# Patient Record
Sex: FEMALE | Race: BLACK OR AFRICAN AMERICAN | NOT HISPANIC OR LATINO | URBAN - METROPOLITAN AREA
[De-identification: names, ages, dates, MRNs, and addresses within clinical notes are randomized per-mention and may not be internally consistent; named-entity substitution may affect disease eponyms.]

---

## 2017-12-24 ENCOUNTER — INPATIENT (INPATIENT)
Facility: HOSPITAL | Age: 77
LOS: 1 days | Discharge: HOME | DRG: 203 | End: 2017-12-26
Attending: STUDENT IN AN ORGANIZED HEALTH CARE EDUCATION/TRAINING PROGRAM | Admitting: STUDENT IN AN ORGANIZED HEALTH CARE EDUCATION/TRAINING PROGRAM
Payer: COMMERCIAL

## 2017-12-24 VITALS
DIASTOLIC BLOOD PRESSURE: 60 MMHG | TEMPERATURE: 99 F | HEART RATE: 93 BPM | RESPIRATION RATE: 18 BRPM | SYSTOLIC BLOOD PRESSURE: 132 MMHG | WEIGHT: 138.01 LBS | OXYGEN SATURATION: 96 % | HEIGHT: 62 IN

## 2017-12-24 DIAGNOSIS — J45.901 UNSPECIFIED ASTHMA WITH (ACUTE) EXACERBATION: ICD-10-CM

## 2017-12-24 LAB
ALBUMIN SERPL ELPH-MCNC: 3.6 G/DL — SIGNIFICANT CHANGE UP (ref 3.5–5)
ALP SERPL-CCNC: 81 U/L — SIGNIFICANT CHANGE UP (ref 40–120)
ALT FLD-CCNC: 25 U/L DA — SIGNIFICANT CHANGE UP (ref 10–60)
ANION GAP SERPL CALC-SCNC: 10 MMOL/L — SIGNIFICANT CHANGE UP (ref 5–17)
APTT BLD: 32 SEC — SIGNIFICANT CHANGE UP (ref 27.5–37.4)
AST SERPL-CCNC: 25 U/L — SIGNIFICANT CHANGE UP (ref 10–40)
BASOPHILS # BLD AUTO: 0.1 K/UL — SIGNIFICANT CHANGE UP (ref 0–0.2)
BASOPHILS NFR BLD AUTO: 1 % — SIGNIFICANT CHANGE UP (ref 0–2)
BILIRUB SERPL-MCNC: 0.3 MG/DL — SIGNIFICANT CHANGE UP (ref 0.2–1.2)
BUN SERPL-MCNC: 10 MG/DL — SIGNIFICANT CHANGE UP (ref 7–18)
CALCIUM SERPL-MCNC: 9.2 MG/DL — SIGNIFICANT CHANGE UP (ref 8.4–10.5)
CHLORIDE SERPL-SCNC: 98 MMOL/L — SIGNIFICANT CHANGE UP (ref 96–108)
CK MB BLD-MCNC: 0.8 % — SIGNIFICANT CHANGE UP (ref 0–3.5)
CK MB CFR SERPL CALC: 1.7 NG/ML — SIGNIFICANT CHANGE UP (ref 0–3.6)
CK SERPL-CCNC: 202 U/L — SIGNIFICANT CHANGE UP (ref 21–215)
CO2 SERPL-SCNC: 27 MMOL/L — SIGNIFICANT CHANGE UP (ref 22–31)
CREAT SERPL-MCNC: 0.69 MG/DL — SIGNIFICANT CHANGE UP (ref 0.5–1.3)
EOSINOPHIL # BLD AUTO: 0 K/UL — SIGNIFICANT CHANGE UP (ref 0–0.5)
EOSINOPHIL NFR BLD AUTO: 0.5 % — SIGNIFICANT CHANGE UP (ref 0–6)
GLUCOSE SERPL-MCNC: 124 MG/DL — HIGH (ref 70–99)
HCT VFR BLD CALC: 38.7 % — SIGNIFICANT CHANGE UP (ref 34.5–45)
HGB BLD-MCNC: 12.9 G/DL — SIGNIFICANT CHANGE UP (ref 11.5–15.5)
INR BLD: 0.97 RATIO — SIGNIFICANT CHANGE UP (ref 0.88–1.16)
LACTATE SERPL-SCNC: 2.1 MMOL/L — HIGH (ref 0.7–2)
LYMPHOCYTES # BLD AUTO: 2.6 K/UL — SIGNIFICANT CHANGE UP (ref 1–3.3)
LYMPHOCYTES # BLD AUTO: 36.2 % — SIGNIFICANT CHANGE UP (ref 13–44)
MAGNESIUM SERPL-MCNC: 1.5 MG/DL — LOW (ref 1.6–2.6)
MCHC RBC-ENTMCNC: 28.8 PG — SIGNIFICANT CHANGE UP (ref 27–34)
MCHC RBC-ENTMCNC: 33.3 GM/DL — SIGNIFICANT CHANGE UP (ref 32–36)
MCV RBC AUTO: 86.6 FL — SIGNIFICANT CHANGE UP (ref 80–100)
MONOCYTES # BLD AUTO: 0.5 K/UL — SIGNIFICANT CHANGE UP (ref 0–0.9)
MONOCYTES NFR BLD AUTO: 7.4 % — SIGNIFICANT CHANGE UP (ref 2–14)
NEUTROPHILS # BLD AUTO: 3.9 K/UL — SIGNIFICANT CHANGE UP (ref 1.8–7.4)
NEUTROPHILS NFR BLD AUTO: 54.9 % — SIGNIFICANT CHANGE UP (ref 43–77)
NT-PROBNP SERPL-SCNC: 23 PG/ML — SIGNIFICANT CHANGE UP (ref 0–450)
PLATELET # BLD AUTO: 236 K/UL — SIGNIFICANT CHANGE UP (ref 150–400)
POTASSIUM SERPL-MCNC: 3.7 MMOL/L — SIGNIFICANT CHANGE UP (ref 3.5–5.3)
POTASSIUM SERPL-SCNC: 3.7 MMOL/L — SIGNIFICANT CHANGE UP (ref 3.5–5.3)
PROT SERPL-MCNC: 7.3 G/DL — SIGNIFICANT CHANGE UP (ref 6–8.3)
PROTHROM AB SERPL-ACNC: 10.6 SEC — SIGNIFICANT CHANGE UP (ref 9.8–12.7)
RAPID RVP RESULT: SIGNIFICANT CHANGE UP
RBC # BLD: 4.47 M/UL — SIGNIFICANT CHANGE UP (ref 3.8–5.2)
RBC # FLD: 12.3 % — SIGNIFICANT CHANGE UP (ref 10.3–14.5)
SODIUM SERPL-SCNC: 135 MMOL/L — SIGNIFICANT CHANGE UP (ref 135–145)
TROPONIN I SERPL-MCNC: <0.015 NG/ML — SIGNIFICANT CHANGE UP (ref 0–0.04)
TROPONIN I SERPL-MCNC: <0.015 NG/ML — SIGNIFICANT CHANGE UP (ref 0–0.04)
WBC # BLD: 7.1 K/UL — SIGNIFICANT CHANGE UP (ref 3.8–10.5)
WBC # FLD AUTO: 7.1 K/UL — SIGNIFICANT CHANGE UP (ref 3.8–10.5)

## 2017-12-24 PROCEDURE — 99223 1ST HOSP IP/OBS HIGH 75: CPT | Mod: GC

## 2017-12-24 PROCEDURE — 99285 EMERGENCY DEPT VISIT HI MDM: CPT

## 2017-12-24 PROCEDURE — 93010 ELECTROCARDIOGRAM REPORT: CPT

## 2017-12-24 PROCEDURE — 71020: CPT | Mod: 26

## 2017-12-24 RX ORDER — INSULIN LISPRO 100/ML
VIAL (ML) SUBCUTANEOUS
Qty: 0 | Refills: 0 | Status: DISCONTINUED | OUTPATIENT
Start: 2017-12-24 | End: 2017-12-26

## 2017-12-24 RX ORDER — SODIUM CHLORIDE 9 MG/ML
1000 INJECTION, SOLUTION INTRAVENOUS
Qty: 0 | Refills: 0 | Status: DISCONTINUED | OUTPATIENT
Start: 2017-12-24 | End: 2017-12-24

## 2017-12-24 RX ORDER — SODIUM CHLORIDE 9 MG/ML
1000 INJECTION, SOLUTION INTRAVENOUS
Qty: 0 | Refills: 0 | Status: DISCONTINUED | OUTPATIENT
Start: 2017-12-24 | End: 2017-12-25

## 2017-12-24 RX ORDER — IPRATROPIUM/ALBUTEROL SULFATE 18-103MCG
3 AEROSOL WITH ADAPTER (GRAM) INHALATION
Qty: 0 | Refills: 0 | Status: COMPLETED | OUTPATIENT
Start: 2017-12-24 | End: 2017-12-24

## 2017-12-24 RX ADMIN — Medication 3 MILLILITER(S): at 17:57

## 2017-12-24 RX ADMIN — Medication 125 MILLIGRAM(S): at 18:16

## 2017-12-24 RX ADMIN — Medication 3 MILLILITER(S): at 17:58

## 2017-12-24 RX ADMIN — Medication 3 MILLILITER(S): at 18:12

## 2017-12-24 NOTE — ED PROVIDER NOTE - OBJECTIVE STATEMENT
76 y/o female with significant PMHx of DM, presents to the ED c/o cough and SOB x 5 days. Pt reports productive cough is throughout the day, and notes SOB is associated with and without cough. Pt denies similar Sx in the past.  Pt notes she has had wheezing in the past, however denies hospitalization or breathing treatments. Pt denies recent sick contacts, recent travel, long periods of immobilization or Hx of DVT/PE/CHF. Pt denies wheezing, CP, b/l LE swelling, diaphoresis, palpitations, calf pain, fever, or any other complaints. Non-smoker.

## 2017-12-24 NOTE — ED ADULT NURSE NOTE - OBJECTIVE STATEMENT
Patient present to ER C/O cough and SOB x 5 days. Pt reports productive cough  throughout the day, and notes SOB is associated with and without cough.  Pt notes she has had wheezing in the past, however denies hospitalization or breathing treatments. Pt denies recent sick contacts, recent travel, Patient denies any pain or discomfort.

## 2017-12-25 DIAGNOSIS — I73.9 PERIPHERAL VASCULAR DISEASE, UNSPECIFIED: ICD-10-CM

## 2017-12-25 DIAGNOSIS — Z90.710 ACQUIRED ABSENCE OF BOTH CERVIX AND UTERUS: Chronic | ICD-10-CM

## 2017-12-25 DIAGNOSIS — E11.9 TYPE 2 DIABETES MELLITUS WITHOUT COMPLICATIONS: ICD-10-CM

## 2017-12-25 DIAGNOSIS — I10 ESSENTIAL (PRIMARY) HYPERTENSION: ICD-10-CM

## 2017-12-25 DIAGNOSIS — Z29.9 ENCOUNTER FOR PROPHYLACTIC MEASURES, UNSPECIFIED: ICD-10-CM

## 2017-12-25 LAB
24R-OH-CALCIDIOL SERPL-MCNC: 42.5 NG/ML — SIGNIFICANT CHANGE UP (ref 30–80)
CHOLEST SERPL-MCNC: 150 MG/DL — SIGNIFICANT CHANGE UP (ref 10–199)
HBA1C BLD-MCNC: 7.8 % — HIGH (ref 4–5.6)
HDLC SERPL-MCNC: 58 MG/DL — SIGNIFICANT CHANGE UP (ref 40–125)
LIPID PNL WITH DIRECT LDL SERPL: 79 MG/DL — SIGNIFICANT CHANGE UP
TOTAL CHOLESTEROL/HDL RATIO MEASUREMENT: 2.6 RATIO — LOW (ref 3.3–7.1)
TRIGL SERPL-MCNC: 64 MG/DL — SIGNIFICANT CHANGE UP (ref 10–149)
TSH SERPL-MCNC: 0.2 UU/ML — LOW (ref 0.34–4.82)

## 2017-12-25 PROCEDURE — 99232 SBSQ HOSP IP/OBS MODERATE 35: CPT | Mod: GC

## 2017-12-25 RX ORDER — TIOTROPIUM BROMIDE 18 UG/1
1 CAPSULE ORAL; RESPIRATORY (INHALATION) DAILY
Qty: 0 | Refills: 0 | Status: DISCONTINUED | OUTPATIENT
Start: 2017-12-25 | End: 2017-12-25

## 2017-12-25 RX ORDER — AMLODIPINE AND VALSARTAN 5; 320 MG/1; MG/1
1 TABLET, FILM COATED ORAL
Qty: 0 | Refills: 0 | COMMUNITY

## 2017-12-25 RX ORDER — AMLODIPINE BESYLATE 2.5 MG/1
5 TABLET ORAL DAILY
Qty: 0 | Refills: 0 | Status: DISCONTINUED | OUTPATIENT
Start: 2017-12-25 | End: 2017-12-25

## 2017-12-25 RX ORDER — IPRATROPIUM/ALBUTEROL SULFATE 18-103MCG
3 AEROSOL WITH ADAPTER (GRAM) INHALATION EVERY 6 HOURS
Qty: 0 | Refills: 0 | Status: DISCONTINUED | OUTPATIENT
Start: 2017-12-25 | End: 2017-12-25

## 2017-12-25 RX ORDER — VALSARTAN 80 MG/1
80 TABLET ORAL DAILY
Qty: 0 | Refills: 0 | Status: DISCONTINUED | OUTPATIENT
Start: 2017-12-25 | End: 2017-12-26

## 2017-12-25 RX ORDER — CLOPIDOGREL BISULFATE 75 MG/1
1 TABLET, FILM COATED ORAL
Qty: 0 | Refills: 0 | COMMUNITY

## 2017-12-25 RX ORDER — CLOPIDOGREL BISULFATE 75 MG/1
75 TABLET, FILM COATED ORAL DAILY
Qty: 0 | Refills: 0 | Status: DISCONTINUED | OUTPATIENT
Start: 2017-12-25 | End: 2017-12-26

## 2017-12-25 RX ORDER — AMLODIPINE BESYLATE 2.5 MG/1
10 TABLET ORAL DAILY
Qty: 0 | Refills: 0 | Status: DISCONTINUED | OUTPATIENT
Start: 2017-12-25 | End: 2017-12-26

## 2017-12-25 RX ORDER — MAGNESIUM SULFATE 500 MG/ML
1 VIAL (ML) INJECTION ONCE
Qty: 0 | Refills: 0 | Status: COMPLETED | OUTPATIENT
Start: 2017-12-25 | End: 2017-12-25

## 2017-12-25 RX ORDER — ALBUTEROL 90 UG/1
1 AEROSOL, METERED ORAL EVERY 4 HOURS
Qty: 0 | Refills: 0 | Status: DISCONTINUED | OUTPATIENT
Start: 2017-12-25 | End: 2017-12-25

## 2017-12-25 RX ORDER — IPRATROPIUM/ALBUTEROL SULFATE 18-103MCG
3 AEROSOL WITH ADAPTER (GRAM) INHALATION EVERY 6 HOURS
Qty: 0 | Refills: 0 | Status: DISCONTINUED | OUTPATIENT
Start: 2017-12-25 | End: 2017-12-26

## 2017-12-25 RX ORDER — BUDESONIDE AND FORMOTEROL FUMARATE DIHYDRATE 160; 4.5 UG/1; UG/1
2 AEROSOL RESPIRATORY (INHALATION)
Qty: 0 | Refills: 0 | Status: DISCONTINUED | OUTPATIENT
Start: 2017-12-25 | End: 2017-12-26

## 2017-12-25 RX ORDER — METFORMIN HYDROCHLORIDE 850 MG/1
500 TABLET ORAL DAILY
Qty: 0 | Refills: 0 | Status: DISCONTINUED | OUTPATIENT
Start: 2017-12-25 | End: 2017-12-26

## 2017-12-25 RX ORDER — ENOXAPARIN SODIUM 100 MG/ML
40 INJECTION SUBCUTANEOUS DAILY
Qty: 0 | Refills: 0 | Status: DISCONTINUED | OUTPATIENT
Start: 2017-12-25 | End: 2017-12-26

## 2017-12-25 RX ORDER — ATORVASTATIN CALCIUM 80 MG/1
20 TABLET, FILM COATED ORAL AT BEDTIME
Qty: 0 | Refills: 0 | Status: DISCONTINUED | OUTPATIENT
Start: 2017-12-25 | End: 2017-12-26

## 2017-12-25 RX ADMIN — Medication 3 MILLILITER(S): at 09:45

## 2017-12-25 RX ADMIN — Medication 3 MILLILITER(S): at 21:12

## 2017-12-25 RX ADMIN — BUDESONIDE AND FORMOTEROL FUMARATE DIHYDRATE 2 PUFF(S): 160; 4.5 AEROSOL RESPIRATORY (INHALATION) at 21:40

## 2017-12-25 RX ADMIN — Medication 100 GRAM(S): at 01:28

## 2017-12-25 RX ADMIN — Medication 3 MILLILITER(S): at 15:39

## 2017-12-25 RX ADMIN — ENOXAPARIN SODIUM 40 MILLIGRAM(S): 100 INJECTION SUBCUTANEOUS at 11:23

## 2017-12-25 RX ADMIN — BUDESONIDE AND FORMOTEROL FUMARATE DIHYDRATE 2 PUFF(S): 160; 4.5 AEROSOL RESPIRATORY (INHALATION) at 11:23

## 2017-12-25 RX ADMIN — Medication 4: at 12:23

## 2017-12-25 RX ADMIN — SODIUM CHLORIDE 80 MILLILITER(S): 9 INJECTION, SOLUTION INTRAVENOUS at 00:08

## 2017-12-25 RX ADMIN — Medication 3 MILLILITER(S): at 02:56

## 2017-12-25 RX ADMIN — ATORVASTATIN CALCIUM 20 MILLIGRAM(S): 80 TABLET, FILM COATED ORAL at 21:40

## 2017-12-25 RX ADMIN — METFORMIN HYDROCHLORIDE 500 MILLIGRAM(S): 850 TABLET ORAL at 17:17

## 2017-12-25 RX ADMIN — VALSARTAN 80 MILLIGRAM(S): 80 TABLET ORAL at 17:17

## 2017-12-25 RX ADMIN — Medication 3: at 17:17

## 2017-12-25 RX ADMIN — Medication 200 MILLIGRAM(S): at 21:40

## 2017-12-25 RX ADMIN — Medication 3: at 08:53

## 2017-12-25 RX ADMIN — CLOPIDOGREL BISULFATE 75 MILLIGRAM(S): 75 TABLET, FILM COATED ORAL at 11:23

## 2017-12-25 NOTE — H&P ADULT - PROBLEM SELECTOR PLAN 2
patient takes metformin at 500 mg BID   will start patient on sliding scale and Acc checks   - f/u A1C and lipid panel

## 2017-12-25 NOTE — H&P ADULT - PROBLEM SELECTOR PLAN 1
77 year old female with acute onset of cough with sob, Afebrile, no leucocytosis, Cxr no infiltrate, chest is clear to ascultation, no wheezing or leg swelling, D-dimer neg, tropX2 negative, normal BNP and patient is been non-smoker.  - patient likely have viral bronchitis vs underlying reactive or obstructive airway disease   - will treat with Duoneb, ipratropium   - continue with Robitussin for cough 77 year old female with acute onset of cough with sob, Afebrile, no leucocytosis, Cxr no infiltrate, chest is clear to ascultation, no wheezing or leg swelling, D-dimer neg, tropX2 negative, normal BNP and patient is been non-smoker.  - patient likely have viral bronchitis vs underlying reactive or obstructive airway disease   - will treat with Duoneb, advair  - continue with Robitussin for cough 77 year old female with acute onset of cough with sob, Afebrile, no leucocytosis, Cxr no infiltrate, chest is clear to ascultation, no wheezing or leg swelling, D-dimer neg, tropX2 negative, normal BNP and patient is been non-smoker.  - patient likely have viral bronchitis vs underlying reactive or obstructive airway disease   - will treat with Duoneb, advair  - solumedrol 40 mg IV Q12    - continue with Robitussin for cough

## 2017-12-25 NOTE — H&P ADULT - FAMILY HISTORY
Mother  Still living? Unknown  Family history of COPD (chronic obstructive pulmonary disease), Age at diagnosis: Age Unknown     Father  Still living? Unknown  Family history of diabetes mellitus, Age at diagnosis: Age Unknown     Sibling  Still living? Unknown  Family history of breast cancer, Age at diagnosis: Age Unknown

## 2017-12-25 NOTE — H&P ADULT - ASSESSMENT
77 year old  female from home with  PMHx of DM, presents to the ED c/o cough and SOB x 5 days. Pt reports of having productive cough with whitish sputum cough is getting worse and she is feeling SOB even at rest. Patient has SOB with exertion in the past and sometimes she had wheezing as well, denies any h/o of asthma, copd or using inhalers in the past. Patient also had one episode of fever on Tuesday resolved with Tylenol. She also has generalized weakness and decreased appetite since 1 week. Pt denies recent sick contacts, recent travel, long periods of immobilization or Hx of DVT/PE/CHF. Pt denies chest pain, sore throat,  b/l LE swelling, orthopnea, PND,  diaphoresis, palpitations, calf pain, fever, or any other complaints.     In Ed patient was afebrile, HD stable, no leucocytosis, CXR No evidence for focal infiltrate or lobar consolidation. RVP is negative. 77 year old  female from home with  PMHx of DM, PVD and HTN presents to the ED c/o cough and SOB x 5 days. Pt reports of having productive cough with whitish sputum cough is getting worse and she is feeling SOB even at rest. Patient has SOB with exertion in the past and sometimes she had wheezing as well, denies any h/o of asthma, copd or using inhalers in the past. Patient also had one episode of fever on Tuesday resolved with Tylenol. She also has generalized weakness and decreased appetite since 1 week. Pt denies recent sick contacts, recent travel, long periods of immobilization or Hx of DVT/PE/CHF. Pt denies chest pain, sore throat,  b/l LE swelling, orthopnea, PND,  diaphoresis, palpitations, calf pain, fever, or any other complaints.     In Ed patient was afebrile, HD stable, no leucocytosis, CXR No evidence for focal infiltrate or lobar consolidation. RVP is negative.

## 2017-12-25 NOTE — H&P ADULT - NSHPLABSRESULTS_GEN_ALL_CORE
12.9   7.1   )-----------( 236      ( 24 Dec 2017 18:27 )             38.7     12-24    135  |  98  |  10  ----------------------------<  124<H>  3.7   |  27  |  0.69    Ca    9.2      24 Dec 2017 18:27  Mg     1.5     12-24    TPro  7.3  /  Alb  3.6  /  TBili  0.3  /  DBili  x   /  AST  25  /  ALT  25  /  AlkPhos  81  12-24      Vital Signs Last 24 Hrs  T(C): 36.6 (25 Dec 2017 00:25), Max: 37.2 (24 Dec 2017 20:52)  T(F): 97.8 (25 Dec 2017 00:25), Max: 98.9 (24 Dec 2017 20:52)  HR: 115 (25 Dec 2017 00:25) (93 - 115)  BP: 149/77 (25 Dec 2017 00:25) (132/60 - 149/77)  BP(mean): --  RR: 18 (25 Dec 2017 00:25) (18 - 19)  SpO2: 98% (25 Dec 2017 00:25) (96% - 98%)

## 2017-12-25 NOTE — H&P ADULT - RS GEN PE MLT RESP DETAILS PC
no rhonchi/no wheezes/clear to auscultation bilaterally/no rales/good air movement/breath sounds equal

## 2017-12-25 NOTE — H&P ADULT - PROBLEM SELECTOR PLAN 3
DVT prophylaxis with heparin and GI Pepcid as per patient she has h/o PVD currently on plavix, will continue her home dose

## 2017-12-25 NOTE — H&P ADULT - ATTENDING COMMENTS
Agree with above  Patient is seen and examined at bedside. She is 77 year old  female from home with  PMHx of DM, PVD and HTN presents to the ED c/o cough and SOB x 5 days. Pt reports of having productive cough with whitish sputum cough is getting worse and she is feeling SOB even at rest. Patient has SOB with exertion in the past and sometimes she had wheezing as well, denies any h/o of asthma, copd or using inhalers in the past. Patient also had one episode of fever on Tuesday resolved with Tylenol. She also has generalized weakness and decreased appetite since 1 week. Pt denies recent sick contacts, recent travel, long periods of immobilization or Hx of DVT/PE/CHF. Pt denies chest pain, sore throat,  b/l LE swelling, orthopnea, PND,  diaphoresis, palpitations, calf pain, fever, or any other complaints.   She reports that for the past year her SOB is getting worst, she is unable to walk long distances secondary to SOB   ROS and PE as above   Vital Signs Last 24 Hrs  T(C): 36.3 (25 Dec 2017 08:21), Max: 37.2 (24 Dec 2017 20:52)  T(F): 97.4 (25 Dec 2017 08:21), Max: 98.9 (24 Dec 2017 20:52)  HR: 105 (25 Dec 2017 08:21) (93 - 115)  BP: 129/70 (25 Dec 2017 08:21) (129/70 - 149/77)  BP(mean): --  RR: 17 (25 Dec 2017 08:21) (17 - 19)  SpO2: 96% (25 Dec 2017 08:21) (96% - 98%)    A/P 78 yo female presented with SOB and cough of 5 days and worsening SOB for the past year    1. SOB and cough secondary for viral URI vs worsening underlying lung disease   On admission patient is afebrile, with no leukocytosis   RVP is negative   CXR: no consolidations or effusion   No need for antibiotics at this time   c/w Robitussin for cough and DUONEB q6   Will get a TTE     2. HTN: on Exforge ( amlodipine/valsartan 10/160 at home )   c/w Amlodipine 10 mg PO qd and Valsartan at lower doses: 80 mg pO qd   3. DM: f/u HA1C  c/w Metformin 500 mg PO qd and Humalog per SS    4. PVD: c/w Plavix 75 mg   5. DVT prophylaxis: on Heparin Agree with above  Patient is seen and examined at bedside. She is 77 year old  female from home with  PMHx of DM, PVD and HTN presents to the ED c/o cough and SOB x 5 days. Pt reports of having productive cough with whitish sputum cough is getting worse and she is feeling SOB even at rest. Patient has SOB with exertion in the past and sometimes she had wheezing as well, denies any h/o of asthma, copd or using inhalers in the past. Patient also had one episode of fever on Tuesday resolved with Tylenol. She also has generalized weakness and decreased appetite since 1 week. Pt denies recent sick contacts, recent travel, long periods of immobilization or Hx of DVT/PE/CHF. Pt denies chest pain, sore throat,  b/l LE swelling, orthopnea, PND,  diaphoresis, palpitations, calf pain, fever, or any other complaints.   She reports that for the past year her SOB is getting worst, she is unable to walk long distances secondary to SOB   ROS and PE as above   Vital Signs Last 24 Hrs  T(C): 36.3 (25 Dec 2017 08:21), Max: 37.2 (24 Dec 2017 20:52)  T(F): 97.4 (25 Dec 2017 08:21), Max: 98.9 (24 Dec 2017 20:52)  HR: 105 (25 Dec 2017 08:21) (93 - 115)  BP: 129/70 (25 Dec 2017 08:21) (129/70 - 149/77)  BP(mean): --  RR: 17 (25 Dec 2017 08:21) (17 - 19)  SpO2: 96% (25 Dec 2017 08:21) (96% - 98%)    A/P 76 yo female presented with SOB and cough of 5 days and worsening SOB for the past year    1. SOB and cough secondary for viral URI vs worsening underlying lung disease   On admission patient is afebrile, with no leukocytosis   RVP is negative   CXR: no consolidations or effusion   No need for antibiotics at this time   c/w Robitussin for cough and DUONEB q6   Will get a TTE     2. HTN: on Exforge ( amlodipine/valsartan 10/160 at home )   c/w Amlodipine 10 mg PO qd and Valsartan at lower doses: 80 mg pO qd   3. DM: f/u HA1C  c/w Metformin 500 mg PO qd and Humalog per SS    4. PVD: c/w Plavix 75 mg   5. DVT prophylaxis: on enoxaparin

## 2017-12-25 NOTE — H&P ADULT - HISTORY OF PRESENT ILLNESS
77 year old  female from home with  PMHx of DM, presents to the ED c/o cough and SOB x 5 days. Pt reports of having productive cough with whitish sputum cough is getting worse and she is feeling SOB even at rest. Patient has SOB with exertion in the past and sometimes she had wheezing as well, denies any h/o of asthma, copd or using inhalers in the past. Patient also had one episode of fever on Tuesday resolved with Tylenol. She also has generalized weakness and decreased appetite since 1 week. Pt denies recent sick contacts, recent travel, long periods of immobilization or Hx of DVT/PE/CHF. Pt denies chest pain, sore throat,  b/l LE swelling, orthopnea, PND,  diaphoresis, palpitations, calf pain, fever, or any other complaints.     Past Medical H/o DM   Past surgery h/o partial hysterectomy   past denies smoking, or passive smoking 77 year old  female from home with  PMHx of DM, PVD and HTN presents to the ED c/o cough and SOB x 5 days. Pt reports of having productive cough with whitish sputum cough is getting worse and she is feeling SOB even at rest. Patient has SOB with exertion in the past and sometimes she had wheezing as well, denies any h/o of asthma, copd or using inhalers in the past. Patient also had one episode of fever on Tuesday resolved with Tylenol. She also has generalized weakness and decreased appetite since 1 week. Pt denies recent sick contacts, recent travel, long periods of immobilization or Hx of DVT/PE/CHF. Pt denies chest pain, sore throat,  b/l LE swelling, orthopnea, PND,  diaphoresis, palpitations, calf pain, fever, or any other complaints.     Past Medical H/o DM   Past surgery h/o partial hysterectomy   past denies smoking, or passive smoking

## 2017-12-26 VITALS
HEART RATE: 106 BPM | RESPIRATION RATE: 16 BRPM | SYSTOLIC BLOOD PRESSURE: 140 MMHG | TEMPERATURE: 98 F | DIASTOLIC BLOOD PRESSURE: 68 MMHG | OXYGEN SATURATION: 98 %

## 2017-12-26 DIAGNOSIS — J20.8 ACUTE BRONCHITIS DUE TO OTHER SPECIFIED ORGANISMS: ICD-10-CM

## 2017-12-26 LAB
ANION GAP SERPL CALC-SCNC: 8 MMOL/L — SIGNIFICANT CHANGE UP (ref 5–17)
BUN SERPL-MCNC: 17 MG/DL — SIGNIFICANT CHANGE UP (ref 7–18)
CALCIUM SERPL-MCNC: 8.8 MG/DL — SIGNIFICANT CHANGE UP (ref 8.4–10.5)
CHLORIDE SERPL-SCNC: 99 MMOL/L — SIGNIFICANT CHANGE UP (ref 96–108)
CO2 SERPL-SCNC: 29 MMOL/L — SIGNIFICANT CHANGE UP (ref 22–31)
CREAT SERPL-MCNC: 0.82 MG/DL — SIGNIFICANT CHANGE UP (ref 0.5–1.3)
GLUCOSE SERPL-MCNC: 179 MG/DL — HIGH (ref 70–99)
HCT VFR BLD CALC: 35.3 % — SIGNIFICANT CHANGE UP (ref 34.5–45)
HGB BLD-MCNC: 11.5 G/DL — SIGNIFICANT CHANGE UP (ref 11.5–15.5)
MAGNESIUM SERPL-MCNC: 1.9 MG/DL — SIGNIFICANT CHANGE UP (ref 1.6–2.6)
MCHC RBC-ENTMCNC: 27.7 PG — SIGNIFICANT CHANGE UP (ref 27–34)
MCHC RBC-ENTMCNC: 32.6 GM/DL — SIGNIFICANT CHANGE UP (ref 32–36)
MCV RBC AUTO: 85 FL — SIGNIFICANT CHANGE UP (ref 80–100)
PHOSPHATE SERPL-MCNC: 2.9 MG/DL — SIGNIFICANT CHANGE UP (ref 2.5–4.5)
PLATELET # BLD AUTO: 215 K/UL — SIGNIFICANT CHANGE UP (ref 150–400)
POTASSIUM SERPL-MCNC: 3.7 MMOL/L — SIGNIFICANT CHANGE UP (ref 3.5–5.3)
POTASSIUM SERPL-SCNC: 3.7 MMOL/L — SIGNIFICANT CHANGE UP (ref 3.5–5.3)
PROCALCITONIN SERPL-MCNC: <0.05 NG/ML — SIGNIFICANT CHANGE UP (ref 0–0.04)
RBC # BLD: 4.15 M/UL — SIGNIFICANT CHANGE UP (ref 3.8–5.2)
RBC # FLD: 12.4 % — SIGNIFICANT CHANGE UP (ref 10.3–14.5)
SODIUM SERPL-SCNC: 136 MMOL/L — SIGNIFICANT CHANGE UP (ref 135–145)
WBC # BLD: 9.4 K/UL — SIGNIFICANT CHANGE UP (ref 3.8–10.5)
WBC # FLD AUTO: 9.4 K/UL — SIGNIFICANT CHANGE UP (ref 3.8–10.5)

## 2017-12-26 PROCEDURE — 87798 DETECT AGENT NOS DNA AMP: CPT

## 2017-12-26 PROCEDURE — 80048 BASIC METABOLIC PNL TOTAL CA: CPT

## 2017-12-26 PROCEDURE — 85379 FIBRIN DEGRADATION QUANT: CPT

## 2017-12-26 PROCEDURE — 83880 ASSAY OF NATRIURETIC PEPTIDE: CPT

## 2017-12-26 PROCEDURE — 83735 ASSAY OF MAGNESIUM: CPT

## 2017-12-26 PROCEDURE — 84484 ASSAY OF TROPONIN QUANT: CPT

## 2017-12-26 PROCEDURE — 85610 PROTHROMBIN TIME: CPT

## 2017-12-26 PROCEDURE — 87486 CHLMYD PNEUM DNA AMP PROBE: CPT

## 2017-12-26 PROCEDURE — 93306 TTE W/DOPPLER COMPLETE: CPT

## 2017-12-26 PROCEDURE — 99239 HOSP IP/OBS DSCHRG MGMT >30: CPT

## 2017-12-26 PROCEDURE — 87581 M.PNEUMON DNA AMP PROBE: CPT

## 2017-12-26 PROCEDURE — 80061 LIPID PANEL: CPT

## 2017-12-26 PROCEDURE — 94640 AIRWAY INHALATION TREATMENT: CPT

## 2017-12-26 PROCEDURE — 87040 BLOOD CULTURE FOR BACTERIA: CPT

## 2017-12-26 PROCEDURE — 83036 HEMOGLOBIN GLYCOSYLATED A1C: CPT

## 2017-12-26 PROCEDURE — 80053 COMPREHEN METABOLIC PANEL: CPT

## 2017-12-26 PROCEDURE — 82553 CREATINE MB FRACTION: CPT

## 2017-12-26 PROCEDURE — 82306 VITAMIN D 25 HYDROXY: CPT

## 2017-12-26 PROCEDURE — 36415 COLL VENOUS BLD VENIPUNCTURE: CPT

## 2017-12-26 PROCEDURE — 71046 X-RAY EXAM CHEST 2 VIEWS: CPT

## 2017-12-26 PROCEDURE — 82550 ASSAY OF CK (CPK): CPT

## 2017-12-26 PROCEDURE — 85730 THROMBOPLASTIN TIME PARTIAL: CPT

## 2017-12-26 PROCEDURE — 84145 PROCALCITONIN (PCT): CPT

## 2017-12-26 PROCEDURE — 99285 EMERGENCY DEPT VISIT HI MDM: CPT | Mod: 25

## 2017-12-26 PROCEDURE — 84100 ASSAY OF PHOSPHORUS: CPT

## 2017-12-26 PROCEDURE — 84443 ASSAY THYROID STIM HORMONE: CPT

## 2017-12-26 PROCEDURE — 84480 ASSAY TRIIODOTHYRONINE (T3): CPT

## 2017-12-26 PROCEDURE — 87633 RESP VIRUS 12-25 TARGETS: CPT

## 2017-12-26 PROCEDURE — 82962 GLUCOSE BLOOD TEST: CPT

## 2017-12-26 PROCEDURE — 96374 THER/PROPH/DIAG INJ IV PUSH: CPT

## 2017-12-26 PROCEDURE — 93005 ELECTROCARDIOGRAM TRACING: CPT

## 2017-12-26 PROCEDURE — 85027 COMPLETE CBC AUTOMATED: CPT

## 2017-12-26 PROCEDURE — 83605 ASSAY OF LACTIC ACID: CPT

## 2017-12-26 RX ORDER — ATORVASTATIN CALCIUM 80 MG/1
1 TABLET, FILM COATED ORAL
Qty: 30 | Refills: 0 | OUTPATIENT
Start: 2017-12-26 | End: 2018-01-24

## 2017-12-26 RX ORDER — ROSUVASTATIN CALCIUM 5 MG/1
1 TABLET ORAL
Qty: 0 | Refills: 0 | COMMUNITY

## 2017-12-26 RX ORDER — METFORMIN HYDROCHLORIDE 850 MG/1
1 TABLET ORAL
Qty: 60 | Refills: 0 | OUTPATIENT
Start: 2017-12-26 | End: 2018-01-24

## 2017-12-26 RX ORDER — METFORMIN HYDROCHLORIDE 850 MG/1
1 TABLET ORAL
Qty: 0 | Refills: 0 | COMMUNITY

## 2017-12-26 RX ADMIN — Medication 3 MILLILITER(S): at 09:50

## 2017-12-26 RX ADMIN — Medication 200 MILLIGRAM(S): at 06:22

## 2017-12-26 RX ADMIN — Medication 3 MILLILITER(S): at 14:40

## 2017-12-26 RX ADMIN — VALSARTAN 80 MILLIGRAM(S): 80 TABLET ORAL at 06:22

## 2017-12-26 RX ADMIN — METFORMIN HYDROCHLORIDE 500 MILLIGRAM(S): 850 TABLET ORAL at 12:59

## 2017-12-26 RX ADMIN — Medication 2: at 11:50

## 2017-12-26 RX ADMIN — Medication 3 MILLILITER(S): at 02:45

## 2017-12-26 RX ADMIN — CLOPIDOGREL BISULFATE 75 MILLIGRAM(S): 75 TABLET, FILM COATED ORAL at 11:50

## 2017-12-26 RX ADMIN — ENOXAPARIN SODIUM 40 MILLIGRAM(S): 100 INJECTION SUBCUTANEOUS at 11:49

## 2017-12-26 RX ADMIN — BUDESONIDE AND FORMOTEROL FUMARATE DIHYDRATE 2 PUFF(S): 160; 4.5 AEROSOL RESPIRATORY (INHALATION) at 10:32

## 2017-12-26 RX ADMIN — AMLODIPINE BESYLATE 10 MILLIGRAM(S): 2.5 TABLET ORAL at 06:22

## 2017-12-26 RX ADMIN — Medication 1: at 08:24

## 2017-12-26 NOTE — PROGRESS NOTE ADULT - SUBJECTIVE AND OBJECTIVE BOX
PGY 1 Note discussed with supervising resident and primary attending    Patient is a 77y old  Female who presents with a chief complaint of cough and SOB (25 Dec 2017 00:19)      INTERVAL HPI/OVERNIGHT EVENTS: offers no new complaints; current symptoms resolving    MEDICATIONS  (STANDING):  ALBUTerol/ipratropium for Nebulization 3 milliLiter(s) Nebulizer every 6 hours  amLODIPine   Tablet 10 milliGRAM(s) Oral daily  atorvastatin 20 milliGRAM(s) Oral at bedtime  buDESOnide  80 MICROgram(s)/formoterol 4.5 MICROgram(s) Inhaler 2 Puff(s) Inhalation two times a day  clopidogrel Tablet 75 milliGRAM(s) Oral daily  enoxaparin Injectable 40 milliGRAM(s) SubCutaneous daily  insulin lispro (HumaLOG) corrective regimen sliding scale   SubCutaneous three times a day before meals  metFORMIN 500 milliGRAM(s) Oral daily  valsartan 80 milliGRAM(s) Oral daily    MEDICATIONS  (PRN):  guaiFENesin    Syrup 200 milliGRAM(s) Oral every 6 hours PRN Cough      __________________________________________________  REVIEW OF SYSTEMS:    CONSTITUTIONAL: No fever,   EYES: no acute visual disturbances  NECK: No pain or stiffness  RESPIRATORY: No cough; No shortness of breath  CARDIOVASCULAR: No chest pain, no palpitations  GASTROINTESTINAL: No pain. No nausea or vomiting; No diarrhea   NEUROLOGICAL: No headache or numbness, no tremors  MUSCULOSKELETAL: No joint pain, no muscle pain  GENITOURINARY: no dysuria, no frequency, no hesitancy  PSYCHIATRY: no depression , no anxiety  ALL OTHER  ROS negative        Vital Signs Last 24 Hrs  T(C): 36.4 (26 Dec 2017 08:14), Max: 36.7 (25 Dec 2017 23:53)  T(F): 97.6 (26 Dec 2017 08:14), Max: 98 (25 Dec 2017 23:53)  HR: 92 (26 Dec 2017 08:14) (92 - 106)  BP: 122/57 (26 Dec 2017 08:14) (112/53 - 139/69)  BP(mean): --  RR: 17 (26 Dec 2017 08:14) (17 - 18)  SpO2: 95% (26 Dec 2017 08:14) (95% - 98%)    ________________________________________________  PHYSICAL EXAM:  GENERAL: NAD  HEENT: Normocephalic;  conjunctivae and sclerae clear; moist mucous membranes;   NECK : supple  CHEST/LUNG: Clear to auscultation bilaterally with good air entry   HEART: S1 S2  regular; no murmurs, gallops or rubs  ABDOMEN: Soft, Nontender, Nondistended; Bowel sounds present  EXTREMITIES: no cyanosis; no edema; no calf tenderness  SKIN: warm and dry; no rash  NERVOUS SYSTEM:  Awake and alert; Oriented  to place, person and time ; no new deficits    _________________________________________________  LABS:                        11.5   9.4   )-----------( 215      ( 26 Dec 2017 06:04 )             35.3     12-26    136  |  99  |  17  ----------------------------<  179<H>  3.7   |  29  |  0.82    Ca    8.8      26 Dec 2017 06:04  Phos  2.9     12-26  Mg     1.9     12-26    TPro  7.3  /  Alb  3.6  /  TBili  0.3  /  DBili  x   /  AST  25  /  ALT  25  /  AlkPhos  81  12-24    PT/INR - ( 24 Dec 2017 18:27 )   PT: 10.6 sec;   INR: 0.97 ratio         PTT - ( 24 Dec 2017 18:27 )  PTT:32.0 sec    CAPILLARY BLOOD GLUCOSE      POCT Blood Glucose.: 185 mg/dL (26 Dec 2017 07:55)  POCT Blood Glucose.: 233 mg/dL (25 Dec 2017 21:04)  POCT Blood Glucose.: 274 mg/dL (25 Dec 2017 16:08)  POCT Blood Glucose.: 314 mg/dL (25 Dec 2017 11:58)        RADIOLOGY & ADDITIONAL TESTS:      Care Discussed with Consultants : YES    Plan of care was discussed with patient and /or primary care giver; all questions and concerns were addressed and care was aligned with patient's wishes. YES PGY 1 Note discussed with supervising resident and primary attending    Patient is a 77y old  Female who presents with a chief complaint of cough and SOB (25 Dec 2017 00:19)      INTERVAL HPI/OVERNIGHT EVENTS: offers no new complaints; current symptoms resolving    MEDICATIONS  (STANDING):  ALBUTerol/ipratropium for Nebulization 3 milliLiter(s) Nebulizer every 6 hours  amLODIPine   Tablet 10 milliGRAM(s) Oral daily  atorvastatin 20 milliGRAM(s) Oral at bedtime  buDESOnide  80 MICROgram(s)/formoterol 4.5 MICROgram(s) Inhaler 2 Puff(s) Inhalation two times a day  clopidogrel Tablet 75 milliGRAM(s) Oral daily  enoxaparin Injectable 40 milliGRAM(s) SubCutaneous daily  insulin lispro (HumaLOG) corrective regimen sliding scale   SubCutaneous three times a day before meals  metFORMIN 500 milliGRAM(s) Oral daily  valsartan 80 milliGRAM(s) Oral daily    MEDICATIONS  (PRN):  guaiFENesin    Syrup 200 milliGRAM(s) Oral every 6 hours PRN Cough      __________________________________________________  REVIEW OF SYSTEMS:    CONSTITUTIONAL: No fever, no chills  EYES: no acute visual disturbances  NECK: No pain or stiffness  RESPIRATORY: No cough; No shortness of breath  CARDIOVASCULAR: No chest pain, no palpitations  GASTROINTESTINAL: No pain. No nausea or vomiting; No diarrhea   NEUROLOGICAL: No headache or numbness, no tremors  MUSCULOSKELETAL: No joint pain, no muscle pain  GENITOURINARY: no dysuria, no frequency, no hesitancy  PSYCHIATRY: no depression , no anxiety  ALL OTHER  ROS negative        Vital Signs Last 24 Hrs  T(C): 36.4 (26 Dec 2017 08:14), Max: 36.7 (25 Dec 2017 23:53)  T(F): 97.6 (26 Dec 2017 08:14), Max: 98 (25 Dec 2017 23:53)  HR: 92 (26 Dec 2017 08:14) (92 - 106)  BP: 122/57 (26 Dec 2017 08:14) (112/53 - 139/69)  BP(mean): --  RR: 17 (26 Dec 2017 08:14) (17 - 18)  SpO2: 95% (26 Dec 2017 08:14) (95% - 98%)    ________________________________________________  PHYSICAL EXAM:  GENERAL: NAD  HEENT: Normocephalic;  conjunctivae and sclerae clear; moist mucous membranes;   NECK : supple  CHEST/LUNG: Clear to auscultation bilaterally   HEART: S1 S2  regular; no murmurs, gallops or rubs  ABDOMEN: Soft, Nontender, Nondistended; Bowel sounds present  EXTREMITIES: no cyanosis; no edema; no calf tenderness  SKIN: warm and dry; no rash  NERVOUS SYSTEM:  Awake and alert; Oriented  to place, person and time ; no new deficits    _________________________________________________  LABS:                        11.5   9.4   )-----------( 215      ( 26 Dec 2017 06:04 )             35.3     12-26    136  |  99  |  17  ----------------------------<  179<H>  3.7   |  29  |  0.82    Ca    8.8      26 Dec 2017 06:04  Phos  2.9     12-26  Mg     1.9     12-26    TPro  7.3  /  Alb  3.6  /  TBili  0.3  /  DBili  x   /  AST  25  /  ALT  25  /  AlkPhos  81  12-24    PT/INR - ( 24 Dec 2017 18:27 )   PT: 10.6 sec;   INR: 0.97 ratio         PTT - ( 24 Dec 2017 18:27 )  PTT:32.0 sec    CAPILLARY BLOOD GLUCOSE      POCT Blood Glucose.: 185 mg/dL (26 Dec 2017 07:55)  POCT Blood Glucose.: 233 mg/dL (25 Dec 2017 21:04)  POCT Blood Glucose.: 274 mg/dL (25 Dec 2017 16:08)  POCT Blood Glucose.: 314 mg/dL (25 Dec 2017 11:58)        RADIOLOGY & ADDITIONAL TESTS:      Care Discussed with Consultants : YES    Plan of care was discussed with patient and /or primary care giver; all questions and concerns were addressed and care was aligned with patient's wishes. YES

## 2017-12-26 NOTE — DISCHARGE NOTE ADULT - PATIENT PORTAL LINK FT
“You can access the [employer] Patient Portal, offered by [facility], by registering with the following website: [email or web address]”

## 2017-12-26 NOTE — DISCHARGE NOTE ADULT - PLAN OF CARE
RESOLUTION your symptoms are likely due to viral bronchitis. You will follow up with your primary care doctor within a week. HbA1C<7% Your HbA1c: 7.8%. You will continue with Metformin 1,000 mg twice a day. You will follow up with your primary care doctor within a week. You will need to repeat HbA1C levels within 3 months. Try your best to avoid foods high in sugar. You will continue with home medication. Monitor your blood pressure at home daily and document readings. You will follow up with your primary care doctor within a week for a routinary check up and adjustment of medications if clinically indicated. Low salt diet encouraged. Continue with Plavix home dose as indicated in the note. Follow up with your primary care doctor within a week.

## 2017-12-26 NOTE — DISCHARGE NOTE ADULT - HOSPITAL COURSE
77 year old  female from home with  PMHx of DM, PVD and HTN presents to the ED c/o cough and SOB x 5 days. Pt reports of having productive cough with whitish sputum cough is getting worse and she is feeling SOB even at rest. Patient has SOB with exertion in the past and sometimes she had wheezing as well, denies any h/o of asthma, copd or using inhalers in the past. Patient also had one episode of fever on Tuesday resolved with Tylenol. She also has generalized weakness and decreased appetite since 1 week. Pt denies recent sick contacts, recent travel, long periods of immobilization or Hx of DVT/PE/CHF. Pt denies chest pain, sore throat,  b/l LE swelling, orthopnea, PND,  diaphoresis, palpitations, calf pain, fever, or any other complaints.     Patient admitted to rule out reactive airway disease.    Presented with acute onset of cough with sob, Afebrile, no leucocytosis, CXR showed no infiltrate, chest was clear to ascultation, no wheezing or leg swelling,   D-dimer negative, troponins negative x2, normal BNP. Patient has no history of tobacco smoking.  Patient likely has viral bronchitis.  Treated with  Duoneb, advair and solumedrol 40 mg IV Q12  initially.  Robitussin for cough.   TTE revealed:      Given patients clinical improvement, decision was made to discharge patient home today.  Please refer to medical records for a complete history as this is only a brief summary.,

## 2017-12-26 NOTE — DISCHARGE NOTE ADULT - MEDICATION SUMMARY - MEDICATIONS TO TAKE
I will START or STAY ON the medications listed below when I get home from the hospital:    metFORMIN 1000 mg oral tablet  -- 1 tab(s) by mouth every 12 hours   -- Check with your doctor before becoming pregnant.  Do not drink alcoholic beverages when taking this medication.  It is very important that you take or use this exactly as directed.  Do not skip doses or discontinue unless directed by your doctor.  Obtain medical advice before taking any non-prescription drugs as some may affect the action of this medication.  Take with food or milk.    -- Indication: For Diabetes    atorvastatin 20 mg oral tablet  -- 1 tab(s) by mouth once a day (at bedtime)  -- Indication: For Hyperlipidemia    Exforge 10 mg-160 mg oral tablet  -- 1 tab(s) by mouth 2 times a day  -- Indication: For HTN (hypertension)    Plavix 75 mg oral tablet  -- 1 tab(s) by mouth once a day  -- Indication: For PVD (peripheral vascular disease)

## 2017-12-26 NOTE — PROGRESS NOTE ADULT - PROBLEM SELECTOR PLAN 2
patient takes metformin at 500 mg BID   will start patient on sliding scale and Acc checks   - f/u A1C and lipid panel Metformin at 500 mg BID   HbA1C: 7.8%   Will continue with Metformin 1,000 mg PO BID upon discharge

## 2017-12-26 NOTE — DISCHARGE NOTE ADULT - MEDICATION SUMMARY - MEDICATIONS TO STOP TAKING
I will STOP taking the medications listed below when I get home from the hospital:    rosuvastatin 5 mg oral tablet  -- 1 tab(s) by mouth once a day (at bedtime)    metFORMIN 500 mg oral tablet  -- 1 tab(s) by mouth 2 times a day

## 2017-12-26 NOTE — PROGRESS NOTE ADULT - ASSESSMENT
77 year old  female from home with  PMHx of DM, PVD and HTN presents to the ED c/o cough and SOB x 5 days. Pt reports of having productive cough with whitish sputum cough is getting worse and she is feeling SOB even at rest. Patient has SOB with exertion in the past and sometimes she had wheezing as well, denies any h/o of asthma, copd or using inhalers in the past. Patient also had one episode of fever on Tuesday resolved with Tylenol. She also has generalized weakness and decreased appetite since 1 week. Pt denies recent sick contacts, recent travel, long periods of immobilization or Hx of DVT/PE/CHF. Pt denies chest pain, sore throat,  b/l LE swelling, orthopnea, PND,  diaphoresis, palpitations, calf pain, fever, or any other complaints.     In Ed patient was afebrile, HD stable, no leucocytosis, CXR No evidence for focal infiltrate or lobar consolidation. RVP is negative.

## 2017-12-26 NOTE — DISCHARGE NOTE ADULT - CARE PLAN
Principal Discharge DX:	Viral bronchitis  Goal:	RESOLUTION  Instructions for follow-up, activity and diet:	your symptoms are likely due to viral bronchitis. You will follow up with your primary care doctor within a week.  Secondary Diagnosis:	DM (diabetes mellitus)  Goal:	HbA1C<7%  Instructions for follow-up, activity and diet:	Your HbA1c: 7.8%. You will continue with Metformin 1,000 mg twice a day. You will follow up with your primary care doctor within a week. You will need to repeat HbA1C levels within 3 months. Try your best to avoid foods high in sugar.  Secondary Diagnosis:	HTN (hypertension)  Instructions for follow-up, activity and diet:	You will continue with home medication. Monitor your blood pressure at home daily and document readings. You will follow up with your primary care doctor within a week for a routinary check up and adjustment of medications if clinically indicated. Low salt diet encouraged.  Secondary Diagnosis:	PVD (peripheral vascular disease)  Instructions for follow-up, activity and diet:	Continue with Plavix home dose as indicated in the note. Follow up with your primary care doctor within a week.

## 2017-12-26 NOTE — PROGRESS NOTE ADULT - ATTENDING COMMENTS
Agree with above   Patient is seen and examined at bedside. She reports the improvement of SOB   ROS and PE as above   Vital Signs Last 24 Hrs  T(C): 36.4 (26 Dec 2017 08:14), Max: 36.7 (25 Dec 2017 23:53)  T(F): 97.6 (26 Dec 2017 08:14), Max: 98 (25 Dec 2017 23:53)  HR: 92 (26 Dec 2017 08:14) (92 - 106)  BP: 122/57 (26 Dec 2017 08:14) (112/53 - 139/69)  BP(mean): --  RR: 17 (26 Dec 2017 08:14) (17 - 18)  SpO2: 95% (26 Dec 2017 08:14) (95% - 98%)    78 yo patient presented with SOB     1. SOB likely secondary to resolving URI vs underlying lung disease   Improved on DUONEB and Budesonide   TTE is done and pending reading   Patient is medically stable to be discharged after TTE resolved     2. DM: HA1C 7.8   Patient takes Metformin 500 mg PO BID  at home   Metformin can be increased to 1000 mg PO BID     3. PVD: c/w Plavix   4. HTN: BP controlled Amlodipine 10 and Valsartan 80 mg Po qd   5. DVT prophylaxis: On enoxaparin   PT eval done and recommending home with home PT

## 2017-12-26 NOTE — PROGRESS NOTE ADULT - PROBLEM SELECTOR PLAN 4
patient is on Exforge at home 10/160/25   takes 2 tabs a day   - currently Bp is stable   will start patient on amlodipine 10 mg bid Amlodipine 10 mg QD  Monitor BP and adjust as needed Amlodipine 10 mg QD + Valsartan 80 mg QD  Monitor BP and adjust as needed

## 2017-12-26 NOTE — PROGRESS NOTE ADULT - PROBLEM SELECTOR PLAN 1
77 year old female with acute onset of cough with sob, Afebrile, no leucocytosis, CXR no infiltrate, chest is clear to ascultation, no wheezing or leg swelling, D-dimer neg, tropX2 negative, normal BNP and patient is been non-smoker.  - patient likely have viral bronchitis vs underlying reactive or obstructive airway disease   - will treat with Duoneb, advair  - solumedrol 40 mg IV Q12    - continue with Robitussin for cough Patient is Afebrile, no leucocytosis, CXR shows no infiltrate, chest is CTA B/L  Continue with supportive care  TTE pending to rule out CHF, pulmonary HTN

## 2017-12-26 NOTE — PROGRESS NOTE ADULT - PROBLEM SELECTOR PLAN 3
as per patient she has h/o PVD currently on plavix, will continue her home dose History of PVD currently on Plavix home dose.

## 2017-12-30 LAB
CULTURE RESULTS: SIGNIFICANT CHANGE UP
CULTURE RESULTS: SIGNIFICANT CHANGE UP
SPECIMEN SOURCE: SIGNIFICANT CHANGE UP
SPECIMEN SOURCE: SIGNIFICANT CHANGE UP

## 2019-12-10 ENCOUNTER — OUTPATIENT (OUTPATIENT)
Dept: OUTPATIENT SERVICES | Facility: HOSPITAL | Age: 79
LOS: 1 days | End: 2019-12-10
Payer: COMMERCIAL

## 2019-12-10 DIAGNOSIS — Z90.710 ACQUIRED ABSENCE OF BOTH CERVIX AND UTERUS: Chronic | ICD-10-CM

## 2019-12-10 DIAGNOSIS — R07.9 CHEST PAIN, UNSPECIFIED: ICD-10-CM

## 2019-12-10 PROBLEM — E11.9 TYPE 2 DIABETES MELLITUS WITHOUT COMPLICATIONS: Chronic | Status: ACTIVE | Noted: 2017-12-24

## 2019-12-10 PROCEDURE — A9502: CPT

## 2019-12-10 PROCEDURE — 93017 CV STRESS TEST TRACING ONLY: CPT

## 2019-12-10 PROCEDURE — 78452 HT MUSCLE IMAGE SPECT MULT: CPT

## 2021-11-18 NOTE — ED ADULT NURSE NOTE - NEURO WDL
Patient informed of results no other questions    Alert and oriented to person, place and time, memory intact, behavior appropriate to situation, PERRL.

## 2023-08-01 ENCOUNTER — OUTPATIENT (OUTPATIENT)
Dept: OUTPATIENT SERVICES | Facility: HOSPITAL | Age: 83
LOS: 1 days | End: 2023-08-01
Payer: COMMERCIAL

## 2023-08-01 DIAGNOSIS — Z90.710 ACQUIRED ABSENCE OF BOTH CERVIX AND UTERUS: Chronic | ICD-10-CM

## 2023-08-01 DIAGNOSIS — R06.02 SHORTNESS OF BREATH: ICD-10-CM

## 2023-08-01 PROCEDURE — A9502: CPT

## 2023-08-01 PROCEDURE — 78452 HT MUSCLE IMAGE SPECT MULT: CPT | Mod: MH

## 2023-08-01 PROCEDURE — 93017 CV STRESS TEST TRACING ONLY: CPT

## 2024-01-16 NOTE — ED ADULT NURSE NOTE - PRO INTERPRETER NEED 2
Patient gets her medication from [facility] and there was a glitch in their system.  She was suppose to get her refills by 1.11.24 and now she will not get her medication until 1.20.24.  She is completely out of olmesartan (BENICAR) 40 MG tablet [[id number]] . Other meds she is good with.  Patient asking if she should just go without or if there is samples she can get or can a few pills be called into [facility] in [facility].  Please call and advise.    English Mandarin

## 2025-03-18 NOTE — ED ADULT NURSE NOTE - RESPIRATORY ASSESSMENT
programming changes made today and comfort vs clarity.  RECOMMENDATIONS:     Continue consistent hearing aid use.  Return for hearing aid checks as needed; an appointment was scheduled for 3/17/2026.  Contact audiology if concerns arise.    No charge for today's appointment; [facility] 2 of 3.        Hernando Payne  Audiologist   
- - -